# Patient Record
Sex: FEMALE | Race: OTHER | HISPANIC OR LATINO | Employment: UNEMPLOYED | URBAN - METROPOLITAN AREA
[De-identification: names, ages, dates, MRNs, and addresses within clinical notes are randomized per-mention and may not be internally consistent; named-entity substitution may affect disease eponyms.]

---

## 2017-11-14 ENCOUNTER — HOSPITAL ENCOUNTER (EMERGENCY)
Facility: HOSPITAL | Age: 32
Discharge: HOME | End: 2017-11-14
Attending: EMERGENCY MEDICINE | Admitting: EMERGENCY MEDICINE

## 2017-11-14 VITALS
OXYGEN SATURATION: 97 % | SYSTOLIC BLOOD PRESSURE: 121 MMHG | DIASTOLIC BLOOD PRESSURE: 67 MMHG | HEART RATE: 103 BPM | RESPIRATION RATE: 16 BRPM | TEMPERATURE: 97.5 F | WEIGHT: 190 LBS

## 2017-11-14 DIAGNOSIS — Z76.0 MEDICATION REFILL: Primary | ICD-10-CM

## 2017-11-14 PROCEDURE — 99281 EMR DPT VST MAYX REQ PHY/QHP: CPT

## 2017-11-14 RX ORDER — BACLOFEN 10 MG/1
10 TABLET ORAL 3 TIMES DAILY
Qty: 12 TABLET | Refills: 0 | Status: SHIPPED | OUTPATIENT
Start: 2017-11-14 | End: 2017-11-18

## 2017-11-14 RX ORDER — METHYLPREDNISOLONE 4 MG/1
TABLET ORAL
Qty: 21 TABLET | Refills: 0 | Status: SHIPPED | OUTPATIENT
Start: 2017-11-14

## 2017-11-14 RX ORDER — OXYCODONE AND ACETAMINOPHEN 7.5; 325 MG/1; MG/1
1 TABLET ORAL EVERY 4 HOURS PRN
COMMUNITY
End: 2017-11-14

## 2017-11-14 RX ORDER — CYCLOBENZAPRINE HCL 10 MG
10 TABLET ORAL 3 TIMES DAILY PRN
COMMUNITY
End: 2017-11-14

## 2017-11-14 NOTE — ED NOTES
Pt states she is from [place] and was only supposed to be here for 2 days and now here on extended stay  States she normally takes Flexeril 10mg PRN but typically only takes it in the morning and then Percocet 7 5-325mg  States out os percocet for the last 4-5 days and out of flexeril for the last 8 days  Now c/o lower back pain but also had a fall  States these meds are prescribed by Dr Anthony Peres in [place] from [facility] and that she also just started seeing a pain specialist but they haven't given her any meds yet they are trying trigger point injections        Mahesh Bauman, RN  11/14/17 5354

## 2017-11-14 NOTE — DISCHARGE INSTRUCTIONS
Chronic Back Pain   WHAT YOU NEED TO KNOW:   Chronic back pain is back pain that lasts 3 months or longer  This may include pain that has not been controlled or does not improve with treatment  Your back pain may cause weakness or pain that spreads to your arms or legs  DISCHARGE INSTRUCTIONS:   Return to the emergency department if:   · You have severe pain  · You have new signs of numbness or weakness, especially in your lower back, legs, arms, or genital area  · You lose control of your bladder or bowel movements  · You have a fever or sudden weight loss  Contact your healthcare provider if:   · You have new or worsened pain  · You have questions or concerns about your condition or care  Medicines:   · NSAIDs  help decrease swelling and pain  This medicine can be bought with or without a doctor's order  This medicine can cause stomach bleeding or kidney problems in certain people  If you take blood thinner medicine, always ask your healthcare provider if NSAIDs are safe for you  Always read the medicine label and follow the directions on it before using this medicine  · Acetaminophen  decreases pain  It is available without a doctor's order  Ask how much to take and how often to take it  Follow directions  Acetaminophen can cause liver damage if not taken correctly  · Prescription pain medicine  may also be given to decrease pain  Do not wait until the pain is severe before you take this medicine  · Muscle relaxers  help decrease muscle spasms and back pain  · Take your medicine as directed  Contact your healthcare provider if you think your medicine is not helping or if you have side effects  Tell him if you are allergic to any medicine  Keep a list of the medicines, vitamins, and herbs you take  Include the amounts, and when and why you take them  Bring the list or the pill bottles to follow-up visits  Carry your medicine list with you in case of an emergency    Follow up with your healthcare provider as directed: You may be referred to a sports medicine or spine specialist  Write down your questions so you remember to ask them during your visits  Manage your chronic back pain:   · Heat  helps decrease pain and muscle spasms  Apply heat on your back for 15 to 20 minutes every 2 hours or as often as directed  · Stay active  as much as you can without causing more pain  Ask your healthcare provider what exercises are right for you  Do not sit or lie down for long periods  This could make your back pain worse  Avoid heavy lifting until your pain is gone  · A physical therapist  can teach you exercises to help improve movement and strength, and to decrease pain  © 2017 [facility] Information is for End User's use only and may not be sold, redistributed or otherwise used for commercial purposes  All illustrations and images included in CareNotes® are the copyrighted property of [employer]  or Brandan Hudson  The above information is an  only  It is not intended as medical advice for individual conditions or treatments  Talk to your doctor, nurse or pharmacist before following any medical regimen to see if it is safe and effective for you

## 2017-11-14 NOTE — ED PROVIDER NOTES
History  Chief Complaint   Patient presents with    Medication Refill     Pt  requesting medication refill for percocet and flexeril  States she is here visiting from out of state  hx of back pain,arthritis in back  This is a 19-year-old female patient with chronic left-sided low back pain without radicular symptoms  No change in bowel or bladder or saddle anesthesia  She presents here for refill of her Percocet and Flexeril  She states that she is from [place]  and states that she received medicine for family doctor recently sent to pain management had trigger points  This pain is made worse she walks when she stands medication makes it better  She is requesting a refill explained that I cannot refill her Percocet because it is controlled substance and a chronic pain medication and IV willing to help palliate her pain which she agrees  She has an allergy to ibuprofen  No fever no chills no headache no blurred vision or double vision no cough congestion sore throat no nausea vomiting or diarrhea  No urgency frequency or dysuria  Prior to Admission Medications   Prescriptions Last Dose Informant Patient Reported? Taking? cyclobenzaprine (FLEXERIL) 10 mg tablet 11/6/2017  Yes No   Sig: Take 10 mg by mouth 3 (three) times a day as needed for muscle spasms   oxyCODONE-acetaminophen (PERCOCET) 7 5-325 MG per tablet 11/9/2017  Yes No   Sig: Take 1 tablet by mouth every 4 (four) hours as needed for moderate pain      Facility-Administered Medications: None       Past Medical History:   Diagnosis Date    History of creation of ostomy     Perforated bowel ([address] )        Past Surgical History:   Procedure Laterality Date    ABDOMINAL SURGERY      HAND SURGERY         History reviewed  No pertinent family history  I have reviewed and agree with the history as documented      Social History   Substance Use Topics    Smoking status: Current Every Day Smoker    Smokeless tobacco: Never Used   Kyle Rosales Alcohol use No        Review of Systems   All other systems reviewed and are negative  Physical Exam  ED Triage Vitals [11/14/17 0953]   Temperature Pulse Respirations Blood Pressure SpO2   97 5 °F (36 4 °C) 103 16 121/67 97 %      Temp Source Heart Rate Source Patient Position - Orthostatic VS BP Location FiO2 (%)   Temporal -- -- -- --      Pain Score       Worst Possible Pain           Orthostatic Vital Signs  Vitals:    11/14/17 0953   BP: 121/67   Pulse: 103       Physical Exam   Constitutional: She appears well-developed and well-nourished  HENT:   Head: Normocephalic and atraumatic  Right Ear: External ear normal    Left Ear: External ear normal    Nose: Nose normal    Mouth/Throat: Oropharynx is clear and moist    Eyes: Conjunctivae are normal  Pupils are equal, round, and reactive to light  Neck: Normal range of motion  Neck supple  Cardiovascular: Normal rate and regular rhythm  Pulmonary/Chest: Effort normal and breath sounds normal    Abdominal: Soft  Bowel sounds are normal  There is no tenderness  Musculoskeletal: Normal range of motion  Back:    Neurological: She is alert  Skin: Skin is warm  Psychiatric: She has a normal mood and affect  Her behavior is normal    Nursing note and vitals reviewed  ED Medications  Medications - No data to display    Diagnostic Studies  Results Reviewed     None                 No orders to display              Procedures  Procedures       Phone Contacts  ED Phone Contact    ED Course  ED Course                                MDM  CritCare Time    Disposition  Final diagnoses:   None     ED Disposition     ED Disposition Condition Comment    Discharge  Heather Colon discharge to home/self care      Condition at discharge: Good        Follow-up Information     Follow up With Specialties Details Why Contact Info Additional Information    [facility] Schedule an appointment as soon as possible for a visit  [place], [place] [address] [facility]  [phone] [address]  [place], [address], [address] [facility], [address], [place], [facility]        Patient's Medications   Discharge Prescriptions    BACLOFEN 10 MG TABLET    Take 1 tablet by mouth 3 (three) times a day for 4 days       Start Date: 11/14/2017End Date: 11/18/2017       Order Dose: 10 mg       Quantity: 12 tablet    Refills: 0    METHYLPREDNISOLONE 4 MG TBPK    Use as directed on package       Start Date: 11/14/2017End Date: --       Order Dose: --       Quantity: 21 tablet    Refills: 0     No discharge procedures on file      ED Provider  Electronically Signed by           Dianna Friedman PA-C  11/14/17 7756

## 2017-11-14 NOTE — ED NOTES
Pt  Reports she ran out of her meds  4 days ago and her physician in [place] told her to come to the ER       Mook Dave RN  11/14/17 1825

## 2017-11-15 NOTE — ED PROVIDER NOTES
History  Chief Complaint   Patient presents with    Medication Refill     Pt  requesting medication refill for percocet and flexeril  States she is here visiting from [place]  hx of back pain,arthritis in back  HPI    Prior to Admission Medications   Prescriptions Last Dose Informant Patient Reported? Taking? cyclobenzaprine (FLEXERIL) 10 mg tablet 11/6/2017  Yes No   Sig: Take 10 mg by mouth 3 (three) times a day as needed for muscle spasms   oxyCODONE-acetaminophen (PERCOCET) 7 5-325 MG per tablet 11/9/2017  Yes No   Sig: Take 1 tablet by mouth every 4 (four) hours as needed for moderate pain      Facility-Administered Medications: None       Past Medical History:   Diagnosis Date    History of creation of ostomy     [address]for[address] [address]        Past Surgical History:   Procedure Laterality Date    ABDOMINAL SURGERY      HAND SURGERY         History reviewed  No pertinent family history  I have reviewed and agree with the history as documented      Social History   Substance Use Topics    Smoking status: Current Every Day Smoker    Smokeless tobacco: Never Used    Alcohol use No        Review of Systems    Physical Exam  ED Triage Vitals [11/14/17 0953]   Temperature Pulse Respirations Blood Pressure SpO2   97 5 °F (36 4 °C) 103 16 121/67 97 %      Temp Source Heart Rate Source Patient Position - Orthostatic VS BP Location FiO2 (%)   Temporal -- -- -- --      Pain Score       Worst Possible Pain           Orthostatic Vital Signs  Vitals:    11/14/17 0953   BP: 121/67   Pulse: 103       Physical Exam    ED Medications  Medications - No data to display    Diagnostic Studies  Results Reviewed     None                 No orders to display              Procedures  Procedures       Phone Contacts  ED Phone Contact    ED Course  ED Course                                MDM  CritCare Time    Disposition  Final diagnoses:   Medication refill     Time reflects when diagnosis was documented in both MDM as applicable and the Disposition within this note     Time User Action Codes Description Comment    11/15/2017  8:01 AM Berto Serna Add [Z76 0] Medication refill       ED Disposition     ED Disposition Condition Comment    Discharge  Heather Colon discharge to home/self care  Condition at discharge: Good        Follow-up Information     Follow up With Specialties Details Why Contact Info Additional Information    [facility] Schedule an appointment as soon as possible for a visit  [address]  [phone] [address] [place] [address], [address]        Discharge Medication List as of 11/14/2017 11:07 AM      START taking these medications    Details   baclofen 10 mg tablet Take 1 tablet by mouth 3 (three) times a day for 4 days, Starting Tue 11/14/2017, Until Sat 11/18/2017, Print      Methylprednisolone 4 MG TBPK Use as directed on package, Print         STOP taking these medications       cyclobenzaprine (FLEXERIL) 10 mg tablet Comments:   Reason for Stopping:         oxyCODONE-acetaminophen (PERCOCET) 7 5-325 MG per tablet Comments:   Reason for Stopping:             No discharge procedures on file      ED Provider  Electronically Signed by           Anjali Gee PA-C  11/15/17 0604 [facility]  11/15/17 9743